# Patient Record
Sex: MALE | Race: WHITE | Employment: FULL TIME | URBAN - NONMETROPOLITAN AREA
[De-identification: names, ages, dates, MRNs, and addresses within clinical notes are randomized per-mention and may not be internally consistent; named-entity substitution may affect disease eponyms.]

---

## 2024-05-27 ENCOUNTER — OFFICE VISIT (OUTPATIENT)
Dept: URGENT CARE | Facility: CLINIC | Age: 44
End: 2024-05-27
Payer: COMMERCIAL

## 2024-05-27 VITALS
DIASTOLIC BLOOD PRESSURE: 95 MMHG | RESPIRATION RATE: 18 BRPM | HEART RATE: 69 BPM | SYSTOLIC BLOOD PRESSURE: 160 MMHG | OXYGEN SATURATION: 98 % | TEMPERATURE: 98.7 F

## 2024-05-27 DIAGNOSIS — H65.01 RIGHT ACUTE SEROUS OTITIS MEDIA, RECURRENCE NOT SPECIFIED: Primary | ICD-10-CM

## 2024-05-27 PROCEDURE — S9083 URGENT CARE CENTER GLOBAL: HCPCS

## 2024-05-27 PROCEDURE — G0382 LEV 3 HOSP TYPE B ED VISIT: HCPCS

## 2024-05-27 RX ORDER — AMOXICILLIN 500 MG/1
1000 CAPSULE ORAL EVERY 8 HOURS SCHEDULED
Qty: 42 CAPSULE | Refills: 0 | Status: SHIPPED | OUTPATIENT
Start: 2024-05-27 | End: 2024-06-03

## 2024-05-27 NOTE — PROGRESS NOTES
[facility]        NAME: René De Los Santos is a 43 y.o. male  : 1980    MRN: [id number]  DATE: May 27, 2024  TIME: 1:23 PM    Assessment and Plan   Right acute serous otitis media, recurrence not specified [H65.01]  1. Right acute serous otitis media, recurrence not specified  amoxicillin (AMOXIL) 500 mg capsule        Serous otitis media of the right ear on exam.  No perforation or cerumen impaction.  Will start on amoxicillin.  Given advice for at home remedies.  Advised to follow-up with family doctor.  Advised to go to the ER if any symptoms worsen.    Patient Instructions     Take prescribed medication as instructed.  Eat yogurt with live and active cultures and/or take a probiotic at least 3 hours before or after antibiotic dose. Monitor stool for diarrhea and/or blood. If this occurs, contact primary care doctor ASAP.    Tylenol for pain or fever.  Make sure to stay well-hydrated and rest.  Follow up with PCP in 3-5 days.  Proceed to  ER if symptoms worsen.    If tests are performed, our office will contact you with results only if changes need to made to the care plan discussed with you at the visit. You can review your full results on [facility].    Chief Complaint     Chief Complaint   Patient presents with    Earache     Right side onset 3days ago         History of Present Illness       43-year-old male presents to the clinic with right ear pain x 3 days.  Denies recent illness or allergies.  No trauma or injury to the ear.  Did not take any medications over-the-counter for pain.  Denies fever, chills, ear drainage, cough, congestion, rhinorrhea.    Earache   Pertinent negatives include no ear discharge.       Review of Systems   Review of Systems   Constitutional: Negative.    HENT:  Positive for ear pain. Negative for ear discharge.    Respiratory: Negative.     Cardiovascular: Negative.    Musculoskeletal: Negative.    Neurological: Negative.          Current  Medications       Current Outpatient Medications:     amoxicillin (AMOXIL) 500 mg capsule, Take 2 capsules (1,000 mg total) by mouth every 8 (eight) hours for 7 days, Disp: 42 capsule, Rfl: 0    Current Allergies     Allergies as of 05/27/2024    (No Known Allergies)            The following portions of the patient's history were reviewed and updated as appropriate: allergies, current medications, past family history, past medical history, past social history, past surgical history and problem list.     Past Medical History:   Diagnosis Date    GERD (gastroesophageal reflux disease)        No past surgical history on file.    No family history on file.      Medications have been verified.        Objective   /95   Pulse 69   Temp 98.7 °F (37.1 °C)   Resp 18   SpO2 98%        Physical Exam     Physical Exam  Constitutional:       General: He is not in acute distress.     Appearance: Normal appearance. He is not ill-appearing or diaphoretic.   HENT:      Head: Normocephalic and atraumatic.      Right Ear: Ear canal and external ear normal. No drainage, swelling or tenderness. There is no impacted cerumen. Tympanic membrane is erythematous (mild) and bulging.      Left Ear: Tympanic membrane, ear canal and external ear normal.      Nose: Nose normal.      Mouth/Throat:      Mouth: Mucous membranes are moist.      Pharynx: Oropharynx is clear. No posterior oropharyngeal erythema.   Eyes:      Extraocular Movements: Extraocular movements intact.      Conjunctiva/sclera: Conjunctivae normal.      Pupils: Pupils are equal, round, and reactive to light.   Cardiovascular:      Rate and Rhythm: Normal rate and regular rhythm.      Pulses: Normal pulses.      Heart sounds: Normal heart sounds.   Pulmonary:      Effort: Pulmonary effort is normal. No respiratory distress.      Breath sounds: Normal breath sounds.   Skin:     General: Skin is warm and dry.      Capillary Refill: Capillary refill takes less than 2  seconds.      Findings: No erythema or rash.   Neurological:      Mental Status: He is alert and oriented to person, place, and time.

## 2024-05-27 NOTE — PATIENT INSTRUCTIONS
Take prescribed medication as instructed.  Eat yogurt with live and active cultures and/or take a probiotic at least 3 hours before or after antibiotic dose. Monitor stool for diarrhea and/or blood. If this occurs, contact primary care doctor ASAP.    Tylenol for pain or fever.  Make sure to stay well-hydrated and rest.  Follow up with PCP in 3-5 days.  Proceed to  ER if symptoms worsen.    If tests are performed, our office will contact you with results only if changes need to made to the care plan discussed with you at the visit. You can review your full results on [facility].  Líquido en el oído (otitis media serosa)   LO QUE NECESITA SABER:   La OMS es líquido atrapado en el centro del oído, detrás del [facility]. Esta afección suele desarrollarse sin signos ni síntomas de ha infección de oído. La otitis media serosa puede ser causada por ha infección respiratoria superior o alergias. Es más común en el otoño y principios de la primavera.       [facility]CCIONES SOBRE EL [facility]:   Llame a corey médico si:  Usted presenta dolor intenso en el oído.    La pare exterior del oído está dandy o inflamada.    Tiene líquido saliendo del oído.    Usted tiene preguntas o inquietudes acerca de corey condición o cuidado.    Cómo mantenerse saludable:  Lávese las rajinder con frecuencia deepika el día. Utilice agua y jabón. Frótese las rajinder enjabonadas, entrelazando los dedos, deepika al menos 20 segundos. Enjuáguese con agua corriente caliente. Séquese las rajinder con ha toalla limpia o ha toalla de papel. Puede usar un desinfectante para rajinder que contenga alcohol, si no hay agua y jabón disponibles. Enseñe a los niños a lavarse las rajinder y a usar el desinfectante de rajinder.         Evite estar con personas enfermas. Algunos microbios se propagan fácil y rápidamente con el contacto.    Acuda a la consulta de control con corey médico según las indicaciones: Anote kris preguntas para que se acuerde de hacerlas deepika kris  visitas.  © Copyright [employer] 2023 Information is for End User's use only and may not be sold, redistributed or otherwise used for commercial purposes.  Esta información es sólo para uso en educación. Corey intención no es darle un consejo médico sobre enfermedades o tratamientos. Colsulte con corey médico, enfermera o farmacéutico antes de seguir cualquier régimen médico para saber si es seguro y efectivo para usted.